# Patient Record
Sex: FEMALE
[De-identification: names, ages, dates, MRNs, and addresses within clinical notes are randomized per-mention and may not be internally consistent; named-entity substitution may affect disease eponyms.]

---

## 2022-02-08 ENCOUNTER — HOSPITAL ENCOUNTER (EMERGENCY)
Dept: HOSPITAL 15 - ER | Age: 52
Discharge: HOME | End: 2022-02-08
Payer: COMMERCIAL

## 2022-02-08 VITALS — SYSTOLIC BLOOD PRESSURE: 120 MMHG | DIASTOLIC BLOOD PRESSURE: 82 MMHG

## 2022-02-08 DIAGNOSIS — Z20.822: ICD-10-CM

## 2022-02-08 DIAGNOSIS — J18.9: Primary | ICD-10-CM

## 2022-02-08 DIAGNOSIS — G89.29: ICD-10-CM

## 2022-02-08 DIAGNOSIS — M54.50: ICD-10-CM

## 2022-02-08 DIAGNOSIS — K02.9: ICD-10-CM

## 2022-02-08 DIAGNOSIS — J45.909: ICD-10-CM

## 2022-02-08 LAB
ALBUMIN SERPL-MCNC: 3.4 G/DL (ref 3.4–5)
ALP SERPL-CCNC: 63 U/L (ref 45–117)
ALT SERPL-CCNC: 22 U/L (ref 13–56)
ANION GAP SERPL CALCULATED.3IONS-SCNC: 5 MMOL/L (ref 5–15)
BILIRUB SERPL-MCNC: 0.3 MG/DL (ref 0.2–1)
BUN SERPL-MCNC: 10 MG/DL (ref 7–18)
BUN/CREAT SERPL: 11.2
CALCIUM SERPL-MCNC: 8.7 MG/DL (ref 8.5–10.1)
CHLORIDE SERPL-SCNC: 107 MMOL/L (ref 98–107)
CO2 SERPL-SCNC: 27 MMOL/L (ref 21–32)
GLUCOSE SERPL-MCNC: 115 MG/DL (ref 74–106)
HCT VFR BLD AUTO: 38.5 % (ref 36–46)
HGB BLD-MCNC: 12.5 G/DL (ref 12.2–16.2)
MCH RBC QN AUTO: 25.5 PG (ref 28–32)
MCV RBC AUTO: 78.7 FL (ref 80–100)
NRBC BLD QL AUTO: 0.1 %
POTASSIUM SERPL-SCNC: 4.1 MMOL/L (ref 3.5–5.1)
PROT SERPL-MCNC: 8.2 G/DL (ref 6.4–8.2)
SODIUM SERPL-SCNC: 139 MMOL/L (ref 136–145)

## 2022-02-08 PROCEDURE — 99285 EMERGENCY DEPT VISIT HI MDM: CPT

## 2022-02-08 PROCEDURE — 87426 SARSCOV CORONAVIRUS AG IA: CPT

## 2022-02-08 PROCEDURE — 84484 ASSAY OF TROPONIN QUANT: CPT

## 2022-02-08 PROCEDURE — 36415 COLL VENOUS BLD VENIPUNCTURE: CPT

## 2022-02-08 PROCEDURE — 83880 ASSAY OF NATRIURETIC PEPTIDE: CPT

## 2022-02-08 PROCEDURE — 80053 COMPREHEN METABOLIC PANEL: CPT

## 2022-02-08 PROCEDURE — 93005 ELECTROCARDIOGRAM TRACING: CPT

## 2022-02-08 PROCEDURE — 85025 COMPLETE CBC W/AUTO DIFF WBC: CPT

## 2022-02-08 PROCEDURE — 71045 X-RAY EXAM CHEST 1 VIEW: CPT

## 2022-02-08 PROCEDURE — 96372 THER/PROPH/DIAG INJ SC/IM: CPT

## 2022-11-25 ENCOUNTER — HOSPITAL ENCOUNTER (EMERGENCY)
Dept: HOSPITAL 15 - ER | Age: 52
Discharge: HOME | End: 2022-11-25
Payer: MEDICAID

## 2022-11-25 VITALS — WEIGHT: 293 LBS | HEIGHT: 68 IN | BODY MASS INDEX: 44.41 KG/M2

## 2022-11-25 VITALS — DIASTOLIC BLOOD PRESSURE: 89 MMHG | SYSTOLIC BLOOD PRESSURE: 146 MMHG

## 2022-11-25 DIAGNOSIS — M19.90: ICD-10-CM

## 2022-11-25 DIAGNOSIS — J45.901: Primary | ICD-10-CM

## 2022-11-25 PROCEDURE — 94640 AIRWAY INHALATION TREATMENT: CPT

## 2022-11-25 PROCEDURE — 71045 X-RAY EXAM CHEST 1 VIEW: CPT

## 2022-11-25 PROCEDURE — 93005 ELECTROCARDIOGRAM TRACING: CPT

## 2022-11-25 PROCEDURE — 99283 EMERGENCY DEPT VISIT LOW MDM: CPT

## 2024-11-19 ENCOUNTER — HOSPITAL ENCOUNTER (OUTPATIENT)
Dept: HOSPITAL 104 - SCTO | Age: 54
Discharge: HOME | End: 2024-11-19
Payer: MEDICAID

## 2024-11-19 DIAGNOSIS — D72.829: Primary | ICD-10-CM

## 2024-11-19 LAB
ALANINE AMINOTRANSFERASE: 25 U/L (ref 10–49)
ALBUMIN, SERUM: 4.1 GM/DL (ref 3.5–5)
ALBUMIN/GLOBULIN RATIO: 1.4 (ref 1.2–2.2)
ALKALINE PHOSPHATASE: 70 U/L (ref 46–116)
ANION GAP: 5 (ref 7–16)
ASPARTATE AMINO TRANSFERASE: 15 U/L (ref 0–34)
BASOPHILS # (AUTO): 0.1 THOU/MM3 (ref 0–0.2)
BASOPHILS % (AUTO): 1 % (ref 0–2.5)
BILIRUBIN,TOTAL: 0.2 MG/DL (ref 0.3–1.2)
BLOOD UREA NITROGEN: 12 MG/DL (ref 9–23)
BUN/CREATININE RATIO: 12 RATIO (ref 12–20)
CALCIUM (CORRECTED): 9.2 MG/DL (ref 8.5–10.1)
CALCIUM: 9.2 MG/DL (ref 8.3–10.6)
CARBON DIOXIDE: 28.1 MMOL/L (ref 20–31)
CHLORIDE: 108 MMOL/L (ref 98–107)
CREATININE (COMPONENT): 1 MG/DL (ref 0.6–1.3)
EGFR: > 60 SEE NOTE
EOSINOPHILS # (AUTO): 0.2 THOU/MM3 (ref 0–0.5)
EOSINOPHILS % (AUTO): 2 % (ref 0–10)
GLOBULIN: 2.9 GM/DL (ref 2.3–3.5)
GLUCOSE: 116 MG/DL (ref 74–106)
HEMATOCRIT: 39.5 % (ref 36–46)
HEMOGLOBIN: 12.5 G/DL (ref 12–16)
IMM GRANULOCYTES # BLD AUTO: 0.03 THOU/MM3 (ref 0–0)
IMMATURE GRANULOCYTES % (AUTO): 0 % (ref 0–0)
LYMPHOCYTES # (AUTO): 3.2 THOU/MM3 (ref 1–4.8)
LYMPHOCYTES % (AUTO): 33 % (ref 10–50)
MEAN CORPUSCULAR HEMOGLOBIN: 25.9 PG (ref 25–35)
MEAN CORPUSCULAR HGB CONC: 31.6 G/DL (ref 31–37)
MEAN CORPUSCULAR VOLUME: 82 FL (ref 80–100)
MONOCYTES # (AUTO): 0.5 THOU/MM3 (ref 0–0.8)
MONOCYTES % (AUTO): 5 % (ref 0–12)
NEUTROPHILS # (AUTO): 5.8 THOU/MM3 (ref 1.8–7.7)
NEUTROPHILS % (AUTO): 59 % (ref 37–80)
NRBC BLD-RTO: 0 /100 WBC
NUCLEATED RED BLOOD CELL #: 0 THOU/MM3 (ref 0–0)
OSMOLALITY,CALCULATED: 281 (ref 275–295)
PLATELET COUNT: 356 THOU/MM3 (ref 140–440)
POTASSIUM: 5 MMOL/L (ref 3.4–5.1)
RDW STANDARD DEVIATION: 48.1 FL (ref 36.4–46.3)
RED BLOOD COUNT: 4.83 MILN/MM3 (ref 4–5.2)
SODIUM: 141 MMOL/L (ref 136–145)
TOTAL PROTEIN: 7 GM/DL (ref 5.7–8.2)
WHITE BLOOD COUNT: 9.8 THOU/MM3 (ref 3.6–11)

## 2024-11-19 PROCEDURE — 81270 JAK2 GENE: CPT

## 2024-11-19 PROCEDURE — 80053 COMPREHEN METABOLIC PANEL: CPT

## 2024-11-19 PROCEDURE — 81219 CALR GENE COM VARIANTS: CPT

## 2024-11-19 PROCEDURE — 81339 MPL GENE SEQ ALYS EXON 10: CPT

## 2024-11-19 PROCEDURE — 36415 COLL VENOUS BLD VENIPUNCTURE: CPT

## 2024-11-19 PROCEDURE — 81279 JAK2 GENE TRGT SEQUENCE ALYS: CPT

## 2024-11-19 PROCEDURE — 85025 COMPLETE CBC W/AUTO DIFF WBC: CPT

## 2024-12-19 ENCOUNTER — HOSPITAL ENCOUNTER (OUTPATIENT)
Dept: HOSPITAL 104 - SCTC | Age: 54
LOS: 12 days | Discharge: HOME | End: 2024-12-31
Payer: MEDICAID

## 2024-12-19 DIAGNOSIS — G89.29: ICD-10-CM

## 2024-12-19 DIAGNOSIS — Z09: Primary | ICD-10-CM

## 2024-12-19 DIAGNOSIS — Z86.2: ICD-10-CM

## 2024-12-19 DIAGNOSIS — M54.9: ICD-10-CM

## 2024-12-19 DIAGNOSIS — E11.9: ICD-10-CM

## 2024-12-19 DIAGNOSIS — F17.210: ICD-10-CM

## 2024-12-19 DIAGNOSIS — E66.9: ICD-10-CM

## 2024-12-19 DIAGNOSIS — R91.8: ICD-10-CM

## 2024-12-19 PROCEDURE — G0463 HOSPITAL OUTPT CLINIC VISIT: HCPCS

## 2024-12-19 PROCEDURE — 99212 OFFICE O/P EST SF 10 MIN: CPT

## 2025-01-01 ENCOUNTER — HOSPITAL ENCOUNTER (EMERGENCY)
Dept: HOSPITAL 15 - ER | Age: 55
Discharge: HOME | End: 2025-01-01
Payer: MEDICAID

## 2025-01-01 VITALS — BODY MASS INDEX: 44.41 KG/M2 | HEIGHT: 68 IN | WEIGHT: 293 LBS

## 2025-01-01 VITALS — HEART RATE: 95 BPM | SYSTOLIC BLOOD PRESSURE: 158 MMHG | TEMPERATURE: 98.5 F | DIASTOLIC BLOOD PRESSURE: 97 MMHG

## 2025-01-01 VITALS — OXYGEN SATURATION: 98 % | RESPIRATION RATE: 20 BRPM

## 2025-01-01 DIAGNOSIS — J45.901: Primary | ICD-10-CM

## 2025-01-01 DIAGNOSIS — M19.90: ICD-10-CM

## 2025-01-01 PROCEDURE — 71045 X-RAY EXAM CHEST 1 VIEW: CPT

## 2025-01-01 PROCEDURE — 99283 EMERGENCY DEPT VISIT LOW MDM: CPT

## 2025-01-01 PROCEDURE — 94640 AIRWAY INHALATION TREATMENT: CPT

## 2025-01-01 PROCEDURE — 96372 THER/PROPH/DIAG INJ SC/IM: CPT

## 2025-01-01 RX ADMIN — METHYLPREDNISOLONE SODIUM SUCCINATE ONE MG: 125 INJECTION, POWDER, FOR SOLUTION INTRAMUSCULAR; INTRAVENOUS at 10:07

## 2025-01-01 RX ADMIN — ALBUTEROL SULFATE ONE MG: 2.5 SOLUTION RESPIRATORY (INHALATION) at 10:26

## 2025-01-01 RX ADMIN — IPRATROPIUM BROMIDE ONE MG: 0.5 SOLUTION RESPIRATORY (INHALATION) at 10:26

## 2025-01-01 NOTE — DV[facility]
CHEST RADIOGRAPH



Indication: ASTHMA



Technique: Single frontal view of the chest was obtained



COMPARISON: CHEST XRAY 1 VIEW on DOS: 11/25/22, CXR1 on DOS: 11/25/22, CHEST XRAY 1 VIEW on DOS: 2/8/
22



FINDINGS: 



Lines and Tubes: None



Lungs: Increased interstitial prominence.



Pleura: No effusion.



No pneumothorax. 



Cardiomediastinal contours: Unremarkable



Bones: Unremarkable



IMPRESSION: 



Mild congestion 



Electronically Signed by: Vicente Arenas at 01/01/2025 10:55:35 AM

## 2025-01-01 NOTE — ED.PDOC
SOB-HPI


HPI Comments


A 54 YEAR OLD FEMALE PRESENTS TO THE ED WITH COMPLAINT OF COUGH. PATIENT STATES 

SHE HAS BEEN EXPERIENCING A COUGH, CONGESTION, AND BODY ACHES FOR THE PAST 2 

WEEKS. PATIENT NOTES SHE HAS A HISTORY OF ASTHMA. PATIENT DENIES FEVER, CHILLS, 

SHORTNESS OF BREATH, CHEST PAIN, ABDOMINAL PAIN, NAUSEA, VOMITING, HEADACHE, OR 

OTHER COMPLAINTS. NO OTHER SYMPTOMS OR MODIFYING FACTORS AT THIS TIME. PATIENT 

IS ALERT, ORIENTED X 4, AND HAS STEADY GAIT.


Chief Complaint:  Flu like


Time Seen by MD:  09:30


Primary Care Provider:  MOMO


Reviewed notes:  Nurses Notes, Medications, Allergies


Information Source:  Patient


Mode of Arrival:  Ambulatory


Severity:  Moderate


Timing:  Days


Duration:  Since onset, Days


Context:  Spontaneous Onset


PE Risk Factors:  None


History of:  Asthma


Prehospital treatment:  None


Modifying Factors:  Nothing


Associated Signs and Symptoms:  Wheeze, Cough, Nasal Congestion


If cough with SOB:  Productive





Past Medical History


PAST MEDICAL HISTORY:  Arthritis, Asthma


Surgical History:  Denies all surgeries


GYN History:  No Pertinent GYN History





Family History


Family History:  Reviewed,noncontributory to illness





Social History


Smoker:  Non-Smoker


Alcohol:  Denies ETOH Use


Drugs:  Denies Drug Use


Lives In:  Home





Constitutional:  denies: chills, diaphoresis, fatigue, fever, malaise, sweats, 

weakness, others


EENTM:  reports: nose congestion; denies: blurred vision, double vision, ear 

bleeding, ear discharge, ear drainage, ear pain, ear ringing, eye pain, eye 

redness, hearing loss, mouth pain, mouth swelling, nasal discharge, nose 

bleeding, nose pain, photophobia, tearing, throat pain, throat swelling, voice 

changes, others


Respiratory:  reports: cough, wheezing; denies: hemoptysis, orthopnea, SOB at 

rest, shortness of breath, SOB with excertion, stridor, others


Cardiovascular:  denies: chest pain, dizzy spells, diaphoresis, Dyspnea on 

exertion, edema, irregular heart beat, left arm pain, lightheadedness, 

palpitations, PND, syncope, others


Gastrointestinal:  denies: abdomen distended, abdominal pain, blood streaked 

bowels, constipated, diarrhea, dysphagia, difficulty swallowing, hematemesis, 

melena, nausea, poor appetite, poor fluid intake, rectal bleeding, rectal pain, 

vomiting, others


Genitourinary:  denies: abnormal vagina bleeding, burning, dyspareunia, dysuria,

flank pain, frequency, hematuria, incontinence, pain, pregnant, vagina 

discharge, urgency, others


Neurological:  denies: dizziness, fainting, headache, left sided numbness, left 

sided weakness, numbness, paresthesia, pre-existing deficit, right sided 

numbness, right sided weakness, seizure, speech problems, tingling, tremors, 

weakness, others


Musculoskeletal:  denies: back pain, gout, joint pain, joint swelling, muscle 

pain, muscle stiffness, neck pain, others


Integumetry:  denies: bruises, change in color, change in hair/nails, dryness, 

laceration, lesions, lumps, rash, wounds, others


Allergic/Immunocompromised:  denies: Difficulty Healing, Frequent Infections, 

Hives, Itching, others


Hematologic/Lymphatic:  denies: anemia, blood clots, easy bleeding, easy 

bruising, swollen glands, others


Endocrine:  denies: excessive hunger, excessive sweating, excessive thirst, 

excessive urination, flushing, intolerance to cold, intolerance to heat, 

unexplained weight gain, unexplained weight loss, others


Psychiatric:  denies: anxiety, bipolar disorder, depression, hopeless, panic 

disorder, schizophrenia, sleepless, suicidal, others


All Other Systems:  Reviewed and Negative





Physical Exam


General Appearance:  No Apparent Distress, Obese


HEENT:  Normal ENT Inspection, PERRL/EOMI, Pharynx Normal, TMs Normal


Neck:  Full Range of Motion, Non-Tender, Normal, Normal Inspection


Respiratory:  Chest Non-Tender, Expiration, No Accessory Muscle Use, No 

Respiratory Distress, Wheezing


Cardiovascular:  No Edema, No JVD, No Murmur, No Gallop, Normal Peripheral 

Pulses, Regular Rate/Rhythm


Breast Exam:  Deferred


Gastrointestinal:  No Organomegaly, Non Tender, No Pulsatile Mass, Normal Bowel 

Sounds, Soft


Genitalia:  Deferred


Pelvic:  Deferred


Rectal:  Deferred


Extremities:  No calf tenderness, Normal capillary refill, Normal inspection, 

Normal range of motion, Non-tender, No pedal edema


Musculoskeletal :  


   Apperance:  Normal


Neurologic:  Alert, CNs II-XII nml as Tested, No Motor Deficits, Normal Affect, 

Normal Mood, No Sensory Deficits


Cerebellar Function:  Normal


Reflexes:  Normal


Skin:  Dry, Normal Color, Warm


Peripheral Pulses:  2+ carotid (R), 2+ carotid (L)


Lymphatic:  No Adenopathy





Was a procedure done?


Was a procedure done?:  No





Differential Dx


Differential Diagnosis:  Asthma, Bronchitis, Pneumonia, Sinusitis, Allergic 

Rhinitis, Otitis Media, Pharyngitis, URI





X-Ray, Labs, Meds, VS





                                   Vital Signs








  Date Time  Temp Pulse Resp B/P (MAP) Pulse Ox O2 Delivery O2 Flow Rate FiO2


 


1/1/25 10:26   20  98 Room Air* 0 21


 


1/1/25 09:52  95 18  98 Room Air  


 


1/1/25 09:52 98.5 95 18 158/97 (117) 98   





 98.5       


 


1/1/25 09:29 98.5 95 18 158/97 (117) 98   








                               Current Medications








 Medications


  (Trade)  Dose


 Ordered  Sig/Shanika


 Route  Start Time


 Stop Time Status Last Admin


 


 Methylprednisolone


 Sodium Succinate


  (Solu Medrol)  125 mg  ONCE  ONCE


 IM  1/1/25 10:00


 1/1/25 10:02 DC 1/1/25 10:07





 


 Albuterol


  (Ventolin Medneb)  2.5 mg  ONCE  ONCE


 NEB  1/1/25 10:00


 1/1/25 10:02 DC 1/1/25 10:26





 


 Ipratropium


 Bromide


  (Atrovent Medneb)  0.5 mg  ONCE  ONCE


 NEB  1/1/25 10:00


 1/1/25 10:02 DC 1/1/25 10:26











X-Ray, Labs, Meds, VS Comment


EXTERNAL MEDICAL RECORDS REVIEWED: [NONE] 


INDEPENDENT HISTORIANS: [NONE]


SOCIAL DETERMINANTS OF HEALTH: [NONE]





LABS ORDERED: NONE


REVIEWED AND INTERPRETED RESULTS: NONE





IMAGING ORDERED: 


XR CHEST: [INTERPRETED BY ME. NO ACUTE FINDINGS. NO PNEUMONIA. NO 

CONSOLIDATIONS. NO INFILTRATES. PENDING RADIOLOGIST REPORT. ]





TREATMENTS ORDERED: DUONEB 3MG INHL, SOLU-MEDROL 125MG IM





PROCEDURES PERFORMED: NONE





CRITICAL CARE TIME: NONE





I HAVE DISCUSSED THE PATIENT WITH THE ATTENDING PHYSICIAN DR. CHAPIN AND HE AGREES

WITH THE PATIENT'S PLAN OF CARE AND DISPOSITION.





BASED ON HISTORY OF PRESENT ILLNESS, AND PHYSICAL EXAM, PATIENT WILL BE 

DISCHARGED HOME. 





SHARED DECISION MAKING: PATIENT INSTRUCTED TO FOLLOW UP WITH PRIMARY CARE 

PROVIDER IN 1-2 DAYS FOR RE-EVALUATION OF SYMPTOMS. PATIENT VERBALIZES 

UNDERSTANDING TO RETURN TO ED FOR NEW OR WORSENING SYMPTOMS OR IF FOLLOW UP WITH

PCP CANNOT BE OBTAINED. PATIENT FEELS COMFORTABLE GOING HOME AT THIS TIME. ALL 

QUESTIONS ADDRESSED AT TIME OF DISCHARGE.


Images Reviewed?:  Images reviewed and evaluated by me


Time of 1ST Reevaluation:  10:50


Reevaluation 1ST:  Improved


Patient Education/Counseling:  Diagnosis, Treatment, Need For Follow Up


Family Education/Counseling:  Diagnosis, Treatment, Need For Follow Up


Medical Screening:  No EMC Exist At This Time





Departure 1


Departure


Time of Disposition:  10:50


Impression:  


   Primary Impression:  


   Acute asthma exacerbation


   Qualified Codes:  J45.21 - Mild intermittent asthma with (acute) exacerbation


Disposition:  01 HOME / SELF CARE / HOMELESS


Condition:  Stable





Additional Instructions:  


FOLLOW-UP WITH PCP IN 1 TO 2 DAYS.  TAKE MEDICATIONS AS PRESCRIBED.  RETURN TO 

ED FOR ANY NEW OR WORSENING SYMPTOMS.


e-Prescriptions


Prednisone (Prednisone) 20 Mg Tab


60 MG PO DAILY, #18 TAB


   Prov: ANTONIETTA SHARP         1/1/25 


Albuterol Sulfate (Albuterol Sulfate Hfa) 108 Mcg/Act Aer


108 MCG IN TID, #120 AER


   Prov: ANTONIETTA SHARP         1/1/25


Discharged With:  Self





Critical Care Note


Critical Care Time?:  No





Stability


Stability form required:  No





Heart Score


Heart Score:  








Heart Score Response (Comments) Value


 


History N/A 0


 


EKG N/A 0


 


Age N/A 0


 


Risk Factors N/A 0


 


Troponin N/A 0


 


Total  0














I personally scribed for ANTONIETTA SHARP ([email or web address]) on 1/1/25 at 09:58.  

Electronically submitted by Tyrone Corral ([employer]).


I personally scribed for ANTOINETTA SHARP ([email or web address]) on 1/1/25 at 10:37.  

Electronically submitted by Tyrone Corral ([employer]).





ANTONIETTA SHARP                  Jan 1, 2025 09:58

## 2025-03-04 ENCOUNTER — HOSPITAL ENCOUNTER (OUTPATIENT)
Age: 55
Discharge: HOME | End: 2025-03-04
Payer: MEDICAID

## 2025-03-04 DIAGNOSIS — Z32.00: ICD-10-CM

## 2025-03-04 DIAGNOSIS — R91.8: Primary | ICD-10-CM

## 2025-03-04 PROCEDURE — 81025 URINE PREGNANCY TEST: CPT

## 2025-03-04 PROCEDURE — 71250 CT THORAX DX C-: CPT

## 2025-03-04 NOTE — XR_ITS
Examination:  
   
CT chest, without intravenous contrast.  
Sagittal and coronal 2-D reconstructions.  
   
Exam date and time:  March 4, 2025 1624 hours  
Comparison August 8, 2024  
   
INDICATIONS: CT chest August 8, 2024 multiple pulmonary nodules, the largest 6  
mm in the right upper lobe  
   
CTDI:vol (mGy) 27.5   
DLP: (mGycm) 1143   
   
Technique:  
Multiple 3.0 mm axial sections of the chest to been obtained.  
Bone and lung density settings are obtained.  
Sagittal and coronal 2-D reconstructions have been obtained.  
   
Low dose protocols were performed.  One or more of the following dose reduction  
techniques were used;  
automated exposure control, adjustment of the mA and/or KV according to patient  
size, use of   
iterative reconstruction technique.  
   
Findings:  
   
No thoracic aortic aneurysmal dilatation  
Pulmonary artery segments are not enlarged  
No paratracheal tracheobronchial or bronchopulmonary adenopathy  
Multiple stable bilateral pulmonary nodules  
No pneumonia or pulmonary edema  
No visualized liver or splenic lesion  
Intact pancreas  
   
IMPRESSION:  
   
Stable bilateral pulmonary nodules, no new pulmonary nodules

## 2025-03-18 ENCOUNTER — HOSPITAL ENCOUNTER (OUTPATIENT)
Age: 55
Discharge: HOME | End: 2025-03-18
Payer: MEDICAID

## 2025-03-18 DIAGNOSIS — D72.829: Primary | ICD-10-CM

## 2025-03-18 LAB
ALANINE AMINOTRANSFERASE: 18 U/L (ref 10–49)
ALBUMIN, SERUM: 4.2 GM/DL (ref 3.5–5)
ALBUMIN/GLOBULIN RATIO: 1.3 (ref 1.2–2.2)
ALKALINE PHOSPHATASE: 70 U/L (ref 46–116)
ANION GAP: 7 (ref 7–16)
ASPARTATE AMINO TRANSFERASE: < 8 U/L (ref 0–34)
BASOPHILS # (AUTO): 0.1 THOU/MM3 (ref 0–0.2)
BASOPHILS % (AUTO): 0 % (ref 0–2.5)
BILIRUBIN,TOTAL: 0.3 MG/DL (ref 0.3–1.2)
BLOOD UREA NITROGEN: 11 MG/DL (ref 9–23)
BUN/CREATININE RATIO: 11 RATIO (ref 12–20)
CALCIUM (CORRECTED): 8.9 MG/DL (ref 8.5–10.1)
CALCIUM: 8.9 MG/DL (ref 8.3–10.6)
CARBON DIOXIDE: 27 MMOL/L (ref 20–31)
CHLORIDE: 106 MMOL/L (ref 98–107)
CREATININE (COMPONENT): 1 MG/DL (ref 0.6–1.3)
EGFR: > 60 SEE NOTE
EOSINOPHILS # (AUTO): 0.2 THOU/MM3 (ref 0–0.5)
EOSINOPHILS % (AUTO): 1 % (ref 0–10)
GLOBULIN: 3.2 GM/DL (ref 2.3–3.5)
GLUCOSE: 87 MG/DL (ref 74–106)
HEMATOCRIT: 41.2 % (ref 36–46)
HEMOGLOBIN: 13.1 G/DL (ref 12–16)
IMM GRANULOCYTES # BLD AUTO: 0.05 THOU/MM3 (ref 0–0)
IMMATURE GRANULOCYTES % (AUTO): 0 % (ref 0–0)
LYMPHOCYTES # (AUTO): 3.6 THOU/MM3 (ref 1–4.8)
LYMPHOCYTES % (AUTO): 30 % (ref 10–50)
MEAN CORPUSCULAR HEMOGLOBIN: 25.5 PG (ref 25–35)
MEAN CORPUSCULAR HGB CONC: 31.8 G/DL (ref 31–37)
MEAN CORPUSCULAR VOLUME: 80 FL (ref 80–100)
MONOCYTES # (AUTO): 0.8 THOU/MM3 (ref 0–0.8)
MONOCYTES % (AUTO): 6 % (ref 0–12)
NEUTROPHILS # (AUTO): 7.6 THOU/MM3 (ref 1.8–7.7)
NEUTROPHILS % (AUTO): 62 % (ref 37–80)
NRBC BLD-RTO: 0 /100 WBC
NUCLEATED RED BLOOD CELL #: 0 THOU/MM3 (ref 0–0)
OSMOLALITY,CALCULATED: 277 (ref 275–295)
PLATELET COUNT: 371 THOU/MM3 (ref 140–440)
POTASSIUM: 4 MMOL/L (ref 3.4–5.1)
RDW STANDARD DEVIATION: 46.4 FL (ref 36.4–46.3)
RED BLOOD COUNT: 5.13 MILN/MM3 (ref 4–5.2)
SODIUM: 140 MMOL/L (ref 136–145)
TOTAL PROTEIN: 7.4 GM/DL (ref 5.7–8.2)
WHITE BLOOD COUNT: 12.2 THOU/MM3 (ref 3.6–11)

## 2025-03-18 PROCEDURE — 85025 COMPLETE CBC W/AUTO DIFF WBC: CPT

## 2025-03-18 PROCEDURE — 80053 COMPREHEN METABOLIC PANEL: CPT

## 2025-03-18 PROCEDURE — 36415 COLL VENOUS BLD VENIPUNCTURE: CPT

## 2025-03-19 ENCOUNTER — HOSPITAL ENCOUNTER (OUTPATIENT)
Dept: HOSPITAL 104 - SCTC | Age: 55
LOS: 12 days | Discharge: HOME | End: 2025-03-31
Payer: MEDICAID

## 2025-03-19 DIAGNOSIS — E66.9: ICD-10-CM

## 2025-03-19 DIAGNOSIS — F17.210: ICD-10-CM

## 2025-03-19 DIAGNOSIS — Z09: Primary | ICD-10-CM

## 2025-03-19 DIAGNOSIS — R91.8: ICD-10-CM

## 2025-03-19 DIAGNOSIS — E11.9: ICD-10-CM

## 2025-03-19 DIAGNOSIS — Z86.2: ICD-10-CM

## 2025-03-19 PROCEDURE — G0463 HOSPITAL OUTPT CLINIC VISIT: HCPCS

## 2025-03-19 PROCEDURE — 99212 OFFICE O/P EST SF 10 MIN: CPT

## 2025-04-07 NOTE — CTCFLWUP_ITS
Patient: HEIDI ARIAS  
: 1970  
MRN: [id number]  
Page 3 of 5  
   
FOLLOW UP NOTE  
   
DATE OF SERVICE: 3/19/2025  
   
NAME: HEIDI ARIAS  
MRN: [id number]  
ACCOUNT: [id number]  
: 1970  
AGE: 54  
   
INTERVAL HISTORY:  
Patient is in the clinic for follow-up visit.  Patient is in today for mammogram
results. Patient had left breast mammogram at [facility] on 2024, showed
no mammographic evidence of malignancy, 1 year screening recommended.  Imaging 
was ordered due to previous complain of small palpable lump to left breast.  
Patient reports left lump resolved, patient reports it turned out to be a boil 
that spontaneously ruptured and has healed.  Patient has a history of boils 
throughout her body.  Patient denies any palpable lumps to breast axilla and 
neck.  Patient denies abdominal pain chest pain cough weight loss fever.  
   
ONCOLOGY HISTORY:  
Not applicable  
   
DIAGNOSIS:  
History of mild asymptomatic leukocytosis, resolved  
Pulmonary nodules  
   
HISTORY OF PRESENT ILLNESS:  
Labs from 2024 shows WBC 9.8, hemoglobin 12.5, MCV 82, ANC 5.8, platelets 
356,000.  Patient reports good energy and appetite.  Patient denies fever, 
abdominal pain chest pain weight loss.    
HISTORY: Ms. Heidi Hart is a 54-year-old English-speaking female.  
Patient was referred due to due to elevated white blood count cell.  Labs from 
4/15/2024: WBC 12.19.  Patient has a history of diabetes obesity. Patient has a 
history of chronic back pain due to degenerative disc disease.  
   
2022: WBC 11.8, hemoglobin 13.6, hematocrit 42.9, MCV 82, ANC 8.1, 
platelets 383,000  
10/09/2023: WBC 13.20, hemoglobin 13.5, hematocrit 44.0, MCV 83.7, ANC 8.89, 
platelets 430,000  
4/15/2024: WBC 12.19, hemoglobin 13.5, hematocrit 44.4, MCV 82.5, ANC 8.18, 
platelets 407, 000.  
   
2024: WBC 11.9, hemoglobin 13.8, hematocrit 43.5, MCV 83, ANC 7.7, 
platelets 362,000.  
2024: US abdomen  
   
   
2024: WBC 11.2, hemoglobin 13.4, hematocrit 42.2, MCV 82, ANC 7.1, platelets
398,000  
2024: BCR-ABL transcript I not detected  
2024:   
   
2024: CT chest without contrast  
Findings:  
   
No thoracic aortic aneurysm dilatation  
Pulmonary artery segments are not enlarged  
No paratracheal tracheobronchial or bronchopulmonary adenopathy  
6 mm pulmonary nodule right upper lobe image 118  
4 mm pulmonary nodule right upper lobe image 139  
5 mm pulmonary nodule posterior left lung image 150  
4 mm pulmonary nodule posterior left lung image 167  
No pneumonia or pulmonary edema  
Fatty infiltration throughout the liver  
Kidneys partially visualized no hydronephrosis  
   
IMPRESSION:  
   
Multiple pulmonary nodules as above, with this study as baseline recommend 6  
month follow-up chest without contrast  
   
2024: Bilateral mammogram-no mammographic evidence of malignancy 1 year 
mammogram recommended.  
   
OTHER MEDICAL HISTORY/CONDITIONS:  
Diabetes  
Obesity  
Asthma  
Degenerative disc disease  
Denies   
   
FAMILY HISTORY:  
Maternal grandfather - leukemia - dx 50  
   
SOCIAL HISTORY:  
Occupational History - Unemployed  
Education Level - Attended Vocational School, did not graduate  
Marital Status -   
Tobacco Use Note - Smokes 4-5 cigarettes/day x 25 yrs  
ETOH Use Note - Denies  
Drug Note - Smokes marijuana occ  
Abuse/Neglect Note - Denies  
Social History Note 2 - Lives with niece  
   
GYN HISTORY:  
Menarche?-?Age:?14  
Menopause:?  
:?0   
   
MEDICATIONS:  
1. albuterol sulfate - 90 mcg/actuation  As directed  
2. Claritin - 10 mg 1 tab Daily  
3. Flonase - 50 mcg/actuation  As directed  
4. magnesium oxide - 400 mg magnesium 1 Capsule Daily  
5. Ozempic - 0.25 mg or 0.5 mg(2 mg/1.5 mL) 1 Weekly  
6. Vitamin D - 50,000 unit 1 Capsule Weekly  
Medications Last Reconciled by Kristen Farmer MA on 3/19/2025    
   
ALLERGIES: fish; fish  
   
REVIEW OF SYSTEMS:  
A complete 14-point review of systems was performed and is negative except as 
noted in interval history.  
   
PHYSICAL EXAMINATION:  
VITAL SIGNS: Temperature?98.6, B/P?138/80, Oxygen?Saturation?97% Weight?335?lbs  
PAIN: 0 - No pain  
GENERAL APPEARANCE: Appears well, in no apparent distress, appropriately 
interactive.  
HEENT: Normocephalic, no temporal wasting, normal conjunctiva, normal hearing, 
lips without lesions  
CARDIOVASCULAR: Not assessed.  
BREAST: No palpable lump to left breast  
PULMONARY: Normal respiratory effort, no respiratory distress or use of 
accessory muscles, speaking in full sentences, no tachypnea.  
EXTREMITIES: No cyanosis.  
SKIN: Normal skin appearance.  
NEUROLOGIC: Alert and oriented x4.  
PSHYCHIATRIC: Appropriate affect, mood normal, behavior normal, intact thought 
and speech.  
   
LABORATORY DATA:  
I have personally reviewed and interpreted each of the patient?s relevant lab 
tests, abnormal findings are below:  
   
Date  
11/19/24  
3/18/25  
??WHITE?BLOOD?COUNT?(Thou/mm3)  
9.8  
12.2?H  
??RED?BLOOD?COUNT?(Miln/mm3)  
4.83  
5.13  
??HEMOGLOBIN?(gm/dl)  
12.5  
13.1  
??HEMATOCRIT?(%)  
39.5  
41.2  
??PLATELET?COUNT?(Thou/mm3)  
356  
371  
??NEUTROPHILS?%,?AUTO?(%)  
59  
62  
??LYMPH?%,?AUTO?(%)  
33  
30  
??NEUTROPHILS,?AUTO?(Thou/mm3)  
5.8  
7.6  
??GLUCOSE,RANDOM?(mg/dL)  
116?H  
87  
??BLOOD?UREA?NITROGEN?(mg/dL)  
12  
11  
??CREATININE?(mg/dL)  
1.00  
1.00  
??SODIUM?(mmol/L)  
141  
140  
??POTASSIUM?(mmol/L)  
5.0  
4.0  
??CHLORIDE?(mmol/L)  
108?H  
106  
??CrCl?(CandG)?(ml/min)  
94.86  
97.77  
??AST/SGOT?(Unit/L)  
15  
<?8  
??ALT/SGPT?(Unit/L)  
25  
18  
??ALKALINE?PHOSPHATASE?(Unit/L)  
70  
70  
??BILIRUBIN,?TOTAL?(mg/dL)  
0.2?L  
0.3  
??PROTEIN?TOTAL?(gm/dl)  
7.0  
7.4  
??ALBUMIN,?SERUM?(gm/dl)  
4.1  
4.2  
??GLOBULIN?(gm/dl)  
2.9  
3.2  
??ALBUMIN/GLOBULIN?RATIO  
1.4  
1.3  
??CALCIUM,?SERUM?(mg/dL)  
9.2  
8.9  
??CALCIUM?SERUM?(CORRECTED)?(mg/dL)  
9.2  
8.9  
   
ASSESSMENT/PLAN:  
1. Mild leukocytosis, asymptomatic, resolved.  
 BCR-ABL transcript I and JAK2 not detected, 2024.   
Labs from 2024 show WBC 9.8, ANC 5.8, previously on 4/15/2024 WBC 12.9, 
ANC 8.18   
History of obesity, 314 pounds.   
Smoker, 4-5 cigarettes daily for 25 years.   
Multiple pulmonary nodules as detailed above, with this study is baseline 
recommend 6-month follow-up chest CT without contrast, CT chest 2024.    
3/4/2025 CT shows stable bilateral nodules  
2. Palpable lump to left breast resolved, patient reports it was a boil that 
spontaneously ruptured and resolved.  Patient has a history of boils throughout 
body.  
Denies family history of breast cancer.  
Mammogram done at [facility] on 2024 did not show any malignancy, repeat
in 1 year.  
Mammogram ordered  
3. History of diabetes, obesity, chronic back pain   
Continue following up with PCP and pain management  
   
ORDERS:  
Order #  
Description  
[id number]  
Return to PCP for routine care. Follow up with Oncologist upon referral of PCP  
   
   
RETURN TO CLINIC:  
As needed  
BILLING AND COMPLIANCE:  
I reviewed external records from providers outside my specialty as summarized 
above. I spent a total of 50 minutes on this patient?s care on the day of their 
visit excluding time spent related to any billed procedures.  This time includes
time spent with the patient as well as time spent documenting in the medical 
record, reviewing patients records and tests, obtaining history, placing orders,
communicating with other healthcare professionals, counseling the patient, 
family or caregiver, and/or care coordination for the diagnoses above.  
   
Electronically Signed by: Allan Aragon MD  
D: 2025 T: 12:39 AM  
   
CC:  Michelle?Wilver,?MD PCP: Artis Denney  
       Referring: Artis Denney  
   
This document was completed utilizing speech recognition software. Grammatical 
errors, random word insertions, pronoun errors, and incomplete sentences are an 
occasional consequence of this system due to software limitations, ambient 
noise, and hardware issues. Any formal questions or concerns about the content, 
text or information contained within the body of this dictation should be 
directly addressed to the provider for clarification.

## 2025-07-07 ENCOUNTER — HOSPITAL ENCOUNTER (EMERGENCY)
Age: 55
Discharge: HOME | End: 2025-07-07
Payer: MEDICAID

## 2025-07-07 VITALS
SYSTOLIC BLOOD PRESSURE: 145 MMHG | RESPIRATION RATE: 18 BRPM | HEART RATE: 74 BPM | TEMPERATURE: 98.24 F | DIASTOLIC BLOOD PRESSURE: 75 MMHG | OXYGEN SATURATION: 95 %

## 2025-07-07 VITALS — OXYGEN SATURATION: 97 % | TEMPERATURE: 98.24 F | RESPIRATION RATE: 18 BRPM

## 2025-07-07 VITALS
RESPIRATION RATE: 18 BRPM | DIASTOLIC BLOOD PRESSURE: 87 MMHG | HEART RATE: 68 BPM | TEMPERATURE: 98.2 F | SYSTOLIC BLOOD PRESSURE: 131 MMHG | OXYGEN SATURATION: 97 %

## 2025-07-07 VITALS
RESPIRATION RATE: 19 BRPM | DIASTOLIC BLOOD PRESSURE: 81 MMHG | OXYGEN SATURATION: 97 % | HEART RATE: 77 BPM | TEMPERATURE: 98.2 F | SYSTOLIC BLOOD PRESSURE: 147 MMHG

## 2025-07-07 DIAGNOSIS — M54.50: Primary | ICD-10-CM

## 2025-07-07 LAB
ALBUMIN SERPL-MCNC: 4.4 GM/DL (ref 3.5–5)
ALBUMIN/GLOB SERPL: 1.3 {RATIO} (ref 1.2–2.2)
ALP SERPL-CCNC: 71 U/L (ref 46–116)
ALT SERPL-CCNC: 17 U/L (ref 10–49)
ANION GAP SERPL CALC-SCNC: 8 MMOL/L (ref 7–16)
AST SERPL-CCNC: 15 U/L (ref 0–34)
BASOPHILS # BLD AUTO: 0 THOU/MM3 (ref 0–0.2)
BASOPHILS NFR BLD AUTO: 0 % (ref 0–2.5)
BILIRUB SERPL-MCNC: 0.4 MG/DL (ref 0.3–1.2)
BILIRUB UR QL STRIP.AUTO: NEGATIVE
BUN SERPL-MCNC: 11 MG/DL (ref 9–23)
BUN/CREAT SERPL: 12 RATIO (ref 12–20)
CALCIUM ALBUM COR SERPL-MCNC: 9.8 MG/DL (ref 8.5–10.1)
CALCIUM SERPL-MCNC: 9.8 MG/DL (ref 8.3–10.6)
CHLORIDE SERPL-SCNC: 107 MMOL/L (ref 98–107)
CLARITY UR: CLEAR
CO2 SERPL-SCNC: 25.6 MMOL/L (ref 20–31)
COLOR UR: (no result)
CREAT CL PREDICTED SERPL C-G-VRATE: 108.5 ML/MIN (ref 60–?)
CREAT SERPL-MCNC: 0.9 MG/DL (ref 0.6–1.3)
EGFR: > 60 SEE NOTE
EOSINOPHIL # BLD AUTO: 0.2 THOU/MM3 (ref 0–0.5)
EOSINOPHIL NFR BLD AUTO: 1 % (ref 0–10)
GLOBULIN SER CALC-MCNC: 3.4 GM/DL (ref 2.3–3.5)
GLUCOSE SERPL-MCNC: 102 MG/DL (ref 74–106)
GLUCOSE UR QL STRIP.AUTO: NEGATIVE
HCG UR QL: NEGATIVE
HCT VFR BLD AUTO: 42 % (ref 36–46)
HGB BLD-MCNC: 13.7 G/DL (ref 12–16)
HGB UR QL STRIP: NEGATIVE
HYALINE CASTS #/AREA URNS AUTO: (no result) /HPF (ref 0–1)
IMM GRANULOCYTES # BLD AUTO: 0.04 THOU/MM3 (ref 0–0)
IMM GRANULOCYTES NFR BLD AUTO: 0 % (ref 0–0)
KETONES UR QL: NEGATIVE
LEUCINE CRYSTALS [PRESENCE] IN URINE BY COMPUTER ASSISTED METHOD: (no result)
LEUKOCYTE ESTERASE UR QL STRIP: NEGATIVE
LIPASE: 38 U/L (ref 12–53)
LYMPHOCYTES # BLD AUTO: 2.8 THOU/MM3 (ref 1–4.8)
LYMPHOCYTES NFR BLD AUTO: 23 % (ref 10–50)
MCH RBC QN AUTO: 26.1 PG (ref 25–35)
MCHC RBC AUTO-ENTMCNC: 32.6 G/DL (ref 31–37)
MCV RBC AUTO: 80 FL (ref 80–100)
MONOCYTES # BLD AUTO: 0.6 THOU/MM3 (ref 0–0.8)
MONOCYTES NFR BLD AUTO: 5 % (ref 0–12)
MUCOUS THREADS #/AREA URNS AUTO: (no result) /LPF
NEUTROPHILS # BLD AUTO: 8.4 THOU/MM3 (ref 1.8–7.7)
NEUTROPHILS NFR BLD AUTO: 70 % (ref 37–80)
NITRITE UR QL STRIP.AUTO: NEGATIVE
NRBC # BLD: 0 THOU/MM3 (ref 0–0)
NRBC BLD-RTO: 0 /100 WBC
OSMOLALITY,CALCULATED: 280 (ref 275–295)
PH,URINE: 5.5 (ref 5–7)
PLATELET COUNT: 334 THOU/MM3 (ref 140–440)
POTASSIUM: 4.4 MMOL/L (ref 3.4–5.1)
PROTEIN,URINE: NEGATIVE
RBC,URINE: 1 /HPF (ref 0–3)
RDW STANDARD DEVIATION: 45.2 FL (ref 36.4–46.3)
RED BLOOD COUNT: 5.25 MILN/MM3 (ref 4–5.2)
SODIUM: 141 MMOL/L (ref 136–145)
SPECIFIC GRAVITY,URINE: 1.02 (ref 1–1.03)
SQUAMOUS EPITHELIAL CELL,URINE: 1 /HPF (ref 0–5)
TOTAL PROTEIN: 7.8 GM/DL (ref 5.7–8.2)
URN SPEC COLLECT METH UR: (no result)
UROBILINOGEN,URINE: NEGATIVE MG/DL (ref 0–1)
WBC,URINE: 2 /HPF (ref 0–5)
WHITE BLOOD COUNT: 12 THOU/MM3 (ref 3.6–11)

## 2025-07-07 PROCEDURE — 83690 ASSAY OF LIPASE: CPT

## 2025-07-07 PROCEDURE — 74176 CT ABD & PELVIS W/O CONTRAST: CPT

## 2025-07-07 PROCEDURE — 81025 URINE PREGNANCY TEST: CPT

## 2025-07-07 PROCEDURE — 36415 COLL VENOUS BLD VENIPUNCTURE: CPT

## 2025-07-07 PROCEDURE — 81001 URINALYSIS AUTO W/SCOPE: CPT

## 2025-07-07 PROCEDURE — 85025 COMPLETE CBC W/AUTO DIFF WBC: CPT

## 2025-07-07 PROCEDURE — 99284 EMERGENCY DEPT VISIT MOD MDM: CPT

## 2025-07-07 PROCEDURE — A9270 NON-COVERED ITEM OR SERVICE: HCPCS

## 2025-07-07 PROCEDURE — 96372 THER/PROPH/DIAG INJ SC/IM: CPT

## 2025-07-07 PROCEDURE — 80053 COMPREHEN METABOLIC PANEL: CPT
